# Patient Record
Sex: MALE | Race: WHITE | NOT HISPANIC OR LATINO | URBAN - METROPOLITAN AREA
[De-identification: names, ages, dates, MRNs, and addresses within clinical notes are randomized per-mention and may not be internally consistent; named-entity substitution may affect disease eponyms.]

---

## 2023-06-15 LAB
GRAM STAIN: NORMAL
TISSUE/WOUND CULTURE/SMEAR: NORMAL

## 2023-10-02 PROBLEM — J32.9 CHRONIC RHINOSINUSITIS: Status: ACTIVE | Noted: 2023-10-02

## 2023-10-02 PROBLEM — D14.0 INVERTED PAPILLOMA OF NASAL CAVITY: Status: ACTIVE | Noted: 2023-10-02

## 2023-10-02 PROBLEM — J33.9 NASAL POLYPS: Status: ACTIVE | Noted: 2023-10-02

## 2023-10-02 PROBLEM — J34.3 HYPERTROPHY OF BOTH INFERIOR NASAL TURBINATES: Status: ACTIVE | Noted: 2023-10-02

## 2023-10-02 RX ORDER — METFORMIN HYDROCHLORIDE 1000 MG/1
1000 TABLET ORAL
COMMUNITY

## 2023-10-02 RX ORDER — RANOLAZINE 500 MG/1
500 TABLET, EXTENDED RELEASE ORAL 2 TIMES DAILY
COMMUNITY

## 2023-10-02 RX ORDER — SOD CHLOR,BICARB/SQUEEZ BOTTLE
PACKET, WITH RINSE DEVICE NASAL
COMMUNITY

## 2023-10-02 RX ORDER — POTASSIUM CHLORIDE 750 MG/1
10 TABLET, FILM COATED, EXTENDED RELEASE ORAL DAILY
COMMUNITY

## 2023-10-02 RX ORDER — FUROSEMIDE 40 MG/1
40 TABLET ORAL DAILY
COMMUNITY

## 2023-10-02 RX ORDER — SACUBITRIL AND VALSARTAN 97; 103 MG/1; MG/1
1 TABLET, FILM COATED ORAL 2 TIMES DAILY
COMMUNITY

## 2023-10-02 RX ORDER — CARVEDILOL 12.5 MG/1
12.5 TABLET ORAL
COMMUNITY

## 2023-10-02 RX ORDER — ASPIRIN 81 MG/1
81 TABLET ORAL DAILY
COMMUNITY

## 2023-10-02 RX ORDER — ATORVASTATIN CALCIUM 40 MG/1
40 TABLET, FILM COATED ORAL DAILY
COMMUNITY

## 2023-10-02 RX ORDER — AMLODIPINE BESYLATE 5 MG/1
TABLET ORAL DAILY
COMMUNITY

## 2023-10-02 RX ORDER — GLIPIZIDE 5 MG/1
5 TABLET, FILM COATED, EXTENDED RELEASE ORAL 2 TIMES DAILY
COMMUNITY

## 2023-10-02 RX ORDER — CLOPIDOGREL BISULFATE 75 MG/1
TABLET ORAL DAILY
COMMUNITY

## 2023-10-04 ENCOUNTER — OFFICE VISIT (OUTPATIENT)
Dept: OTOLARYNGOLOGY | Facility: CLINIC | Age: 53
End: 2023-10-04
Payer: COMMERCIAL

## 2023-10-04 VITALS
HEART RATE: 91 BPM | DIASTOLIC BLOOD PRESSURE: 66 MMHG | WEIGHT: 220 LBS | SYSTOLIC BLOOD PRESSURE: 100 MMHG | BODY MASS INDEX: 30.8 KG/M2 | TEMPERATURE: 97.2 F | RESPIRATION RATE: 16 BRPM | HEIGHT: 71 IN

## 2023-10-04 DIAGNOSIS — J32.9 CHRONIC RHINOSINUSITIS: Primary | ICD-10-CM

## 2023-10-04 DIAGNOSIS — J33.9 NASAL POLYPS: ICD-10-CM

## 2023-10-04 DIAGNOSIS — J34.3 HYPERTROPHY OF BOTH INFERIOR NASAL TURBINATES: ICD-10-CM

## 2023-10-04 DIAGNOSIS — R09.81 NASAL CONGESTION: ICD-10-CM

## 2023-10-04 DIAGNOSIS — D14.0 INVERTED PAPILLOMA OF NASAL CAVITY: ICD-10-CM

## 2023-10-04 DIAGNOSIS — J34.89 NASAL OBSTRUCTION: ICD-10-CM

## 2023-10-04 DIAGNOSIS — R44.8 FACIAL PRESSURE: ICD-10-CM

## 2023-10-04 DIAGNOSIS — J34.89 NASAL DRAINAGE: ICD-10-CM

## 2023-10-04 PROCEDURE — 99214 OFFICE O/P EST MOD 30 MIN: CPT | Performed by: STUDENT IN AN ORGANIZED HEALTH CARE EDUCATION/TRAINING PROGRAM

## 2023-10-04 PROCEDURE — 31231 NASAL ENDOSCOPY DX: CPT | Performed by: STUDENT IN AN ORGANIZED HEALTH CARE EDUCATION/TRAINING PROGRAM

## 2023-10-04 PROCEDURE — 4004F PT TOBACCO SCREEN RCVD TLK: CPT | Performed by: STUDENT IN AN ORGANIZED HEALTH CARE EDUCATION/TRAINING PROGRAM

## 2023-10-04 RX ORDER — PRAVASTATIN SODIUM 40 MG/1
40 TABLET ORAL
COMMUNITY
Start: 2017-09-19

## 2023-10-04 RX ORDER — PREDNISONE 10 MG/1
10 TABLET ORAL DAILY
COMMUNITY
Start: 2023-06-12 | End: 2023-10-04 | Stop reason: ALTCHOICE

## 2023-10-04 RX ORDER — SPIRONOLACTONE 25 MG/1
25 TABLET ORAL
COMMUNITY
Start: 2017-11-28

## 2023-10-04 RX ORDER — PREDNISONE 10 MG/1
TABLET ORAL
Qty: 34 TABLET | Refills: 0 | Status: SHIPPED | OUTPATIENT
Start: 2023-10-04 | End: 2023-11-10 | Stop reason: ALTCHOICE

## 2023-10-04 RX ORDER — LISINOPRIL 5 MG/1
5 TABLET ORAL
COMMUNITY
Start: 2017-09-19

## 2023-10-04 ASSESSMENT — ENCOUNTER SYMPTOMS
LOSS OF SENSATION IN FEET: 0
DEPRESSION: 0
OCCASIONAL FEELINGS OF UNSTEADINESS: 0

## 2023-10-04 ASSESSMENT — PAIN SCALES - GENERAL: PAINLEVEL: 0-NO PAIN

## 2023-10-04 NOTE — PATIENT INSTRUCTIONS
What can I expect before and after surgery?     1) Before surgery   In preparation for your surgery, your physician may prescribe a preoperative regimen of medications in order to optimize the condition of your sinuses prior to surgery. The medications may include oral steroids (prednisone) that you start taking 7 days before surgery (don't take it on the day of surgery on) restart on postoperative day 1. Take the antibiotics AFTER the surgery. If any preoperative medications are deemed necessary by your physician, please be sure to start the medications on the day directed and adhere closely to the prescription. In addition, you should avoid taking aspirin and aspirin related products for at least 14 days prior to surgery. Aspirin, ibuprofen (Motrin/Advil), naproxen (Aleve), Excedrin (contains aspirin) and related products can thin the blood and create excessive bleeding both during the surgery and in the postoperative period. Tylenol is acceptable and may be taken any time up to the day of surgery.     2) After surgery   At your first postoperative visit any packs that may have been placed will be removed and your nose will be cleaned of clotted blood and debris. This is an uncomfortable procedure, so we recommend that you take a pain pill approximately one hour prior to your appointment, even if you have not been experiencing much postoperative pain up to that day. You will continue to have additional postoperative visits at an interval determined by your surgeon, until your sinuses have completely healed and are doing well. Sinus surgery does NOT eliminate the underlying causes that gave you sinusitis in the first place, so it is important that you continue your medical therapies (nasal spray, irrigation etc) even after surgery.     How do I prepare for surgery?   If your age and/or medical history indicate it, your physician may order testing prior to surgery such as blood tests, EKG, or chest x-ray. If these  have been ordered, please make sure they are completed before the day of surgery.   If you have a significant increase in your sinus infection in the week(s) prior to surgery, notify us. Your surgery may need to be postponed.   Make sure you have a  to take you home after surgery. You will NOT be allowed to leave the hospital by yourself or to drive yourself home.     DO NOT take aspirin or salicylate containing pain medication for at least ten days prior to surgery. Aspirin, even in small quantities, can significantly increase bleeding during surgery and postoperatively.     DO NOT take non-steroidal anti-inflammatory drugs (Ibuprofen, Advil, Motrin, Aleve, Naproxen) for at least five days prior to surgery. These drugs will also increase bleeding, although the effects on the blood are shorter. You may use Tylenol for pain control during this time if needed. Any other medications which thin the blood (such as Coumadin or Plavix) will need to be stopped prior to surgery. Your surgeon will discuss the timing of when to stop coumadin and whether another short acting medication such as Lovenox will be needed to use in place of the Coumadin while you are off it.     DO NOT smoke if at all possible for at least three weeks prior to surgery. Not only does smoking worsen sinus symptoms, smoking in the weeks before or after surgery will result in excessive scarring, and may result in failure of the operation.     DO NOT eat or drink anything beginning at midnight the night before surgery unless otherwise instructed by our colleagues in anesthesia. In most cases your normal morning medications should be taken with a small sip of water on the morning of surgery - your surgeon will let you know if there any exceptions to this.     NOTIFY YOUR SURGEON if you have a prosthesis or heart valve disorder that requires antibiotics at the time of surgery or if you have had prior difficulty with general anesthesia.     What will  happen during surgery?   The surgery is typically not uncomfortable and should not be an unpleasant experience. Depending on the several factors, the operation may be performed under general anesthesia or under local anesthesia with an anesthesiologist providing monitored sedation.     Regardless of anesthetic method chosen, in the preoperative area an intravenous line will be started to administer fluid and you will be given medicines in your IV to help you relax.   Once in the operating room, if local anesthesia is chosen, you will be given additional IV medication to keep you sleepy and relaxed. Medicated pieces of cotton will be placed in your nose and several injections of local anesthetic will be administered inside your nose. The anesthesiologist will make you extra sleepy during this process. Should you experience significant discomfort during the procedure, we will provide additional monitored sedation.     If you are receiving general anesthesia, a temporary tube is inserted into you mouth/throat after you are completely asleep to maintain your breathing. If the planned surgery is for more than 3 hours duration, a urinary catheter may also be placed while you are asleep. It is usually removed before you leave the recovery room. It is rare that a catheter is necessary. When the surgery is completed, the breathing tube is removed from your airway before you regain consciousness. Patients occasionally report sore throat and/or nausea postoperatively as a result of the breathing tube and anesthetic.     What can I expect following my surgery?   Some bloody postnasal discharge may occur for approximately two weeks after this procedure. This is normal and slowly improves. You SHOULD NOT blow your nose for at least seven days following surgery. Since nasal packing is usually not used, you will have some bleeding from your nose and you may go through more than one box of tissues by spotting them with blood during  the first 24-48 hours after surgery. As the sinuses begin to clear themselves, you can expect to have some thick brown drainage from your nose. This is mucus and old blood and does not indicate an infection. You may experience some discomfort postoperatively due to manipulation and inflammation. Take your pain medication as directed. Often extra-strength Tylenol® is sufficient.     On the the first day after surgery you should begin irrigating six times a day with saline using the [facility] Sinus Rinse bottle. If you have had packing placed in your nose, you will not be able to do these things until the packing is removed. Your surgeon will inform you if packing has been placed.   The first follow-up visit is usually arranged at approximately one week after surgery to clean clot and crusts from the nose. This visit is usually uncomfortable and we recommend that you take a dose of your pain medication about one hour prior to the visit. If you are using narcotic pain medicine, you will need a . Consent to the surgery also includes consent to this postoperative care.     Careful postoperative care by both you and your surgeon is essential to the success of the surgery. It is very important that you follow these instructions, as well as any additional instructions given by us, to promote healing and decrease the chance of complications from the surgery.     SOME IMPORTANT POSTOPERATIVE DO´S AND DON´TS AFTER SURGERY     ° DO NOT blow your nose until you have been given permission to do so (usually one week following surgery).     ° DO NOT bend, lift or strain for at lease one week after surgery. These activities will promote bleeding from your nose. You should not plan on participating in any rigorous activity until healing is completed.     ° DO NOT suppress the need to cough or sneeze, but cough/sneeze with your mouth open.     ° DO NOT resume use of any aspirin-containing products or other blood thinners until  after discussing this with us. Typically, you can restart after 7-10 days.     ° DO NOT overuse Tylenol (acetaminophen). Extra-strength Tylenol can be used for pain but can be harmful to your liver if used overdosed. Your prescription narcotic pain medicine also contains Tylenol, so it is important that you don´t use both at the same time. Do not exceed 2000 mg of Tylenol in 24 hours.     ° DO use nasal saline spray (without decongestant) every hour while you are awake beginning the day after surgery. This helps moisten your nose and prevents large crusts from forming.    ° DO use nasal saline irrigation at least six times daily beginning on day after surgery     ° DO take your antibiotics (if they have been prescribed for you). Mild diarrhea from antibiotic usage is common. This can often be prevented by taking acidophilus daily, which is found in yogurt with active cultures or as tablets in a health food store. If you should experience severe persistent diarrhea, this may be indicative of another health problem. In this case stop the antibiotic and notify us. Further evaluation may be required.     ° DO notify us for any of the following: temperature elevations above 100.5 F, clear watery drainage from your nose, changes in vision, swelling of the eyes, worsening headache or neck stiffness.     ° DO use a Qtip to place antibiotic ointment in the very front inside your nostrils as needed to minimize or soften crusting. Neosporin, bacitracin or double/triple antibiotic ointments are all OK to use, up to twice a day     ° DO resume your normal preoperative medications (except aspirin or other blood thinners as noted above)    For the first week following surgery you should not blow your nose or perform any activities requiring exertion such as bending, straining, exercising or lifting anything heavier than 10 pounds. You should not plan any air travel in the first week after surgery.   Ultimately, it will take about  2-3 months for you to fully recover from surgery

## 2023-10-04 NOTE — PROGRESS NOTES
HPI  10/4/23  The patient presents for follow-up, he is here to discuss his upcoming surgery and clarify questions.     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 No evidence of dysplasia or malignancy.    7/24/23  The patient presents for follow-up, reports improvement of his nasal pressure and nasal congestion following antibiotic course. CT reviewed with the patient notable for bilateral bl pansinusitis with possible dehiscence of the right vidian canal. Partial pneumatization of the right optic strut.  Recall   53-year-old male presenting for initial evaluation of multiple sinonasal complaints.    Main Symptoms: Daily sinonasal symptoms include bilateral nasal congestion/obstruction, anterior and posterior nasal drainage, facial pressure along the maxillary and frontal regions bilaterally but more noticeable on the left, anosmia.   Duration: Years, worse for the past year   Laterality: Bilateral    Patient denies facial numbness, dental pain, watery/ itchy eyes.   No odynophagia/dysphagia/SOB/dyspnea/hoarseness/fevers/chills/weight loss/night sweats.    Current treatment:  Nasal Steroid: Has tried Nasonex without significant improvement of his symptoms  Sinus Rinse: No  Antihistamine: No  Prednisone: No  Other: None    Recent CT: None   Previous nasal/sinus surgery: None      No past medical history on file.    No past surgical history on file.      Current Outpatient Medications on File Prior to Visit   Medication Sig Dispense Refill    amLODIPine (Norvasc) 5 mg tablet Take by mouth once daily.      aspirin 81 mg EC tablet Take 1 tablet (81 mg) by mouth once daily.      atorvastatin (Lipitor) 40 mg tablet Take 1 tablet (40 mg) by mouth once daily.      clopidogrel (Plavix) 75 mg tablet Take by mouth once daily.      furosemide (Lasix) 40 mg tablet Take 1 tablet (40 mg) by mouth once daily.      glipiZIDE XL (Glucotrol XL) 5 mg 24 hr tablet Take 1  tablet (5 mg) by mouth 2 times a day. Do not crush, chew, or split.      lisinopril 5 mg tablet Take 1 tablet (5 mg) by mouth once daily.      metFORMIN (Glucophage) 1,000 mg tablet Take 1 tablet (1,000 mg) by mouth 2 times a day with meals.      potassium chloride CR 10 mEq ER tablet Take 1 tablet (10 mEq) by mouth once daily. Do not crush, chew, or split.      pravastatin (Pravachol) 40 mg tablet Take 1 tablet (40 mg) by mouth once daily.      ranolazine (Ranexa) 500 mg 12 hr tablet Take 1 tablet (500 mg) by mouth 2 times a day. Do not crush, chew, or split.      sacubitriL-valsartan (Entresto)  mg tablet Take 1 tablet by mouth 2 times a day.      sod chloride-sodium bicarb (Sinus Rinse Starter) nasal rinse Administer into affected nostril(s).      spironolactone (Aldactone) 25 mg tablet Take 1 tablet (25 mg) by mouth once daily.      carvedilol (Coreg) 12.5 mg tablet Take 1 tablet (12.5 mg) by mouth 2 times a day with meals.      [DISCONTINUED] predniSONE (Deltasone) 10 mg tablet Take 1 tablet (10 mg) by mouth once daily.       No current facility-administered medications on file prior to visit.        Review of Systems  A detailed 12 point ROS was performed and is negative except as noted in the intake form, HPI and/or Past Medical History     Vitals:    10/04/23 0902   BP: 100/66   Pulse: 91   Resp: 16   Temp: 36.2 °C (97.2 °F)        Physical Exam   CONSTITUTIONAL: Vitals -reviewed from intake field  VOICE: Normal voice quality  RESPIRATION: Breathing comfortably, no stridor.  CV: No clubbing/cyanosis/edema in hands.  EYES: EOM Intact, sclera normal.  NEURO: Alert and oriented times 3, Cranial nerves V,VII intact and symmetric bilaterally.  HEAD AND FACE: Symmetric facial features, no masses or lesions, sinuses nontender to palpation.  SALIVARY GLANDS: Parotid and submandibular glands normal bilaterally.  EARS: Normal external ears, external auditory canals, and TMs to otoscopy  + NOSE: External nose  midline, anterior rhinoscopy is normal with limited visualization to the anterior aspect of the interior turbinates. No lesions noted.  Bilateral inferior turbinate hypertrophy, nasal corridors obstructed by nasal polyps bilaterally  ORAL CAVITY/OROPHARYNX/LIPS: Normal mucous membranes, normal floor of mouth/tongue/OP, no masses or lesions are noted.  PHARYNGEAL WALLS AND NASOPHARYNX: No masses noted. Mucosa appears clean and moist  NECK/LYMPH: No LAD, no thyroid masses. Trachea palpably midline  SKIN: Neck skin is without scar or injury  PSYCH: Alert and oriented with appropriate mood and affect      Nasal endoscopy:  PROCEDURE NOTE    For better visualization because of septal deviation, turbinate hypertrophy nasal endoscopy was performed after verbal consent was obtained by the patient and/or guardian. Both nostrils were sprayed with a mixture of lidocaine 4% and Afrin. After a sufficient amount of time elapsed for mucosal anesthesia to take place, the nasal endoscope was advanced into the nostril.    The following areas were visualized:  Nasal passage, nasal septum, turbinates, middle meatus, nasopharynx, sinus ostia    The patient tolerated the procedure well and these structures were found to be normal except as follows:  Bilateral inferior turbinate hypertrophy  Bilateral generalized mucosal edema  Right: Nasal polyps obstructing most of the nasal corridors,   Left: Nasal corridor completely obstructed by nasal polyps.      No mucopurulence noted bilaterally.    Assessment   Chronic rhinosinusitis with nasal polyps  Facial pressure  Nasal airway obstruction  Nasal drainage  Bilateral inferior turbinate hypertrophy  On anticoagulation, clopidogrel for cardiac stents   Hx of MI, pacemaker/ defibrillator placement     Plan  The patient and I discussed their current nasal / sinus symptoms as well as several therapeutic options. Nasal endoscopy notable for bilateral mucosal edema and nasal polyps obstructing the  nasal corridors bilaterally.   Polypoid tissue from the left nasal corridor biopsied.   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  The condition was reviewed and explained, multiple treatment alternatives, risk and benefits discussed. He would like to proceed with surgery including bilateral functional endoscopic sinus surgery, resection of left sinonasal papilloma, bilateral inferior turbinate reduction/outfracture, possible septoplasty.    The patient and I discussed the aspects of the proposed surgery today. We discussed alternative therapies, which include doing nothing and continuing medical therapy. We discussed the fact that surgery offers an opportunity to correct any anatomic deformities and to relieve a problem that has not been improved with medical therapy. The anticipated benefits from endoscopic surgery include but are not limited to: eradication of sinus disease, relief from pain and symptoms. The risks and complications associated with this surgery were discussed. These include, but were not limited to: anesthesia/medication complications (cva, mi, death), heart/lung/brain dysfunction, synechiae formation, poor cosmetic result possibly requiring reconstructive surgery, septal perforation possibly requiring reconstructive surgery, bleeding, infection, postoperative pain, failure to relieve pain and other preoperative symptoms, visual acuity changes (temporary or permanent), loss of smell/taste, csf leak, need for further surgeries in future. Each of these potential complications was discussed in detail. No guarantees were implied and the possibility of recurrence was discussed and understood. The nature of the procedure and the pre and postoperative processes were discussed as well.   The patient understands, asked appropriate questions, and wishes to proceed with surgery.       Underwent cardiology clearance and pre/post operative anticoagulation  planning.  Continue saline/mometasone rinses  Preoperative prednisone taper prescribed (risks /potential adverse effects discussed)  Smoking cessation discussed

## 2023-10-17 NOTE — PROGRESS NOTES
Discussed results with the patient over the phone.  Stopped using systemic steroids yesterday and glucose measurement today was in the low 200s.  He will continue to monitor his glucose and optimize control.

## 2023-10-19 PROCEDURE — 88305 TISSUE EXAM BY PATHOLOGIST: CPT

## 2023-10-19 PROCEDURE — 88305 TISSUE EXAM BY PATHOLOGIST: CPT | Performed by: PATHOLOGY

## 2023-10-19 PROCEDURE — 88311 DECALCIFY TISSUE: CPT | Performed by: PATHOLOGY

## 2023-10-19 PROCEDURE — 88311 DECALCIFY TISSUE: CPT

## 2023-10-20 ENCOUNTER — LAB REQUISITION (OUTPATIENT)
Dept: LAB | Facility: HOSPITAL | Age: 53
End: 2023-10-20
Payer: COMMERCIAL

## 2023-10-20 DIAGNOSIS — D14.0 BENIGN NEOPLASM OF MIDDLE EAR, NASAL CAVITY AND ACCESSORY SINUSES: ICD-10-CM

## 2023-10-21 PROBLEM — E78.5 HYPERLIPIDEMIA: Chronic | Status: RESOLVED | Noted: 2017-09-15 | Resolved: 2023-10-21

## 2023-10-21 PROBLEM — G89.29 CHRONIC TOE PAIN, LEFT FOOT: Status: RESOLVED | Noted: 2017-09-15 | Resolved: 2023-10-21

## 2023-10-21 PROBLEM — I50.9 ACUTE ON CHRONIC CONGESTIVE HEART FAILURE (MULTI): Status: RESOLVED | Noted: 2020-02-04 | Resolved: 2023-10-21

## 2023-10-21 PROBLEM — I25.10 CAD (CORONARY ARTERY DISEASE): Chronic | Status: RESOLVED | Noted: 2017-09-15 | Resolved: 2023-10-21

## 2023-10-21 PROBLEM — I50.23: Status: RESOLVED | Noted: 2020-02-07 | Resolved: 2023-10-21

## 2023-10-21 PROBLEM — J18.9 PNEUMONIA: Status: RESOLVED | Noted: 2020-02-03 | Resolved: 2023-10-21

## 2023-10-21 PROBLEM — M79.675 CHRONIC TOE PAIN, LEFT FOOT: Status: RESOLVED | Noted: 2017-09-15 | Resolved: 2023-10-21

## 2023-10-21 PROBLEM — R07.9 CHEST PAIN: Status: RESOLVED | Noted: 2022-10-02 | Resolved: 2023-10-21

## 2023-10-21 PROBLEM — J96.01 ACUTE RESPIRATORY FAILURE WITH HYPOXIA (MULTI): Status: RESOLVED | Noted: 2020-02-03 | Resolved: 2023-10-21

## 2023-10-21 PROBLEM — R94.39 ABNORMAL STRESS TEST: Status: RESOLVED | Noted: 2017-11-28 | Resolved: 2023-10-21

## 2023-10-21 PROBLEM — I10 HTN (HYPERTENSION): Chronic | Status: RESOLVED | Noted: 2017-09-15 | Resolved: 2023-10-21

## 2023-10-21 PROBLEM — E11.9 TYPE 2 DIABETES MELLITUS WITHOUT COMPLICATION, WITHOUT LONG-TERM CURRENT USE OF INSULIN (MULTI): Status: RESOLVED | Noted: 2020-02-07 | Resolved: 2023-10-21

## 2023-10-21 PROBLEM — I21.4 NSTEMI (NON-ST ELEVATED MYOCARDIAL INFARCTION) (MULTI): Status: RESOLVED | Noted: 2022-10-01 | Resolved: 2023-10-21

## 2023-10-21 RX ORDER — GLYBURIDE 5 MG/1
5 TABLET ORAL 2 TIMES DAILY
COMMUNITY

## 2023-10-21 RX ORDER — ISOSORBIDE MONONITRATE 30 MG/1
30 TABLET, EXTENDED RELEASE ORAL
COMMUNITY
Start: 2017-11-28

## 2023-10-21 RX ORDER — INSULIN GLARGINE 100 [IU]/ML
15 INJECTION, SOLUTION SUBCUTANEOUS NIGHTLY
COMMUNITY
Start: 2017-09-19

## 2023-10-27 ENCOUNTER — OFFICE VISIT (OUTPATIENT)
Dept: OTOLARYNGOLOGY | Facility: CLINIC | Age: 53
End: 2023-10-27
Payer: COMMERCIAL

## 2023-10-27 VITALS
HEART RATE: 88 BPM | TEMPERATURE: 97.9 F | DIASTOLIC BLOOD PRESSURE: 72 MMHG | RESPIRATION RATE: 16 BRPM | WEIGHT: 220 LBS | SYSTOLIC BLOOD PRESSURE: 109 MMHG | BODY MASS INDEX: 30.8 KG/M2 | HEIGHT: 71 IN

## 2023-10-27 DIAGNOSIS — J34.89 NASAL DRAINAGE: ICD-10-CM

## 2023-10-27 DIAGNOSIS — J32.9 CHRONIC RHINOSINUSITIS: Primary | ICD-10-CM

## 2023-10-27 DIAGNOSIS — J33.9 NASAL POLYPS: ICD-10-CM

## 2023-10-27 DIAGNOSIS — J34.3 HYPERTROPHY OF BOTH INFERIOR NASAL TURBINATES: ICD-10-CM

## 2023-10-27 DIAGNOSIS — J34.89 NASAL CRUSTING: ICD-10-CM

## 2023-10-27 DIAGNOSIS — R44.8 FACIAL PRESSURE: ICD-10-CM

## 2023-10-27 DIAGNOSIS — J34.89 NASAL OBSTRUCTION: ICD-10-CM

## 2023-10-27 DIAGNOSIS — R09.81 NASAL CONGESTION: ICD-10-CM

## 2023-10-27 DIAGNOSIS — D14.0 INVERTED PAPILLOMA OF NASAL CAVITY: ICD-10-CM

## 2023-10-27 PROCEDURE — 99024 POSTOP FOLLOW-UP VISIT: CPT | Performed by: STUDENT IN AN ORGANIZED HEALTH CARE EDUCATION/TRAINING PROGRAM

## 2023-10-27 PROCEDURE — 31237 NSL/SINS NDSC SURG BX POLYPC: CPT | Performed by: STUDENT IN AN ORGANIZED HEALTH CARE EDUCATION/TRAINING PROGRAM

## 2023-10-27 PROCEDURE — 4004F PT TOBACCO SCREEN RCVD TLK: CPT | Performed by: STUDENT IN AN ORGANIZED HEALTH CARE EDUCATION/TRAINING PROGRAM

## 2023-10-27 NOTE — PROGRESS NOTES
The patient is here for follow up.   Most recent surgery: 10/19/23  Resection of left sinonasal papilloma  Bilateral nasal endoscopy with total ethmoidectomy including frontal sinusotomy with tissue removal  Bilateral maxillary endoscopy with  tissue removal  Bilateral submucosal inferior turbinate reductions  Extracranial CT image guidance    Findings:   Papillomatous mass embedded within generalized mucosal inflammation and polypoid tissue attached to the left lateral nasal wall.  Left orbital wall dehiscence, intact periorbita  Polypoid changes throughout dissected sinuses  Propel contour stents placed within each frontal drainage pathway  Middle turbinate resected bilaterally  Mucopurulent drainage noted within the maxillary sinuses bilaterally (culture obtained from the left side)  Nasal pore placed in bilateral middle meatuses at the conclusion of the procedure    Current symptoms: The patient reports he has been doing well, endorses significant improvement of his nasal breathing.  Only using small amount of saline nasal spray, did not start saline irrigations.    Current treatment:  Antibiotics: Doxycycline  Sinus Rinse: No  Steroids: No  Other: None      ROS - No fevers, chills. No cough, hemoptysis. No n/v      Past Medical History:   Diagnosis Date    Abnormal stress test 11/28/2017    Acute on chronic congestive heart failure (CMS/[facility]ly [facility]) 02/04/2020    Acute respiratory failure with hypoxia (CMS/[facility]) 02/03/2020    CAD (coronary artery disease) 09/15/2017    Chest pain 10/02/2022    Chronic toe pain, left foot 09/15/2017    HTN (hypertension) 09/15/2017    Hyperlipidemia 09/15/2017    NSTEMI (non-ST elevated myocardial infarction) (CMS/[facility]) 10/01/2022    Pneumonia 02/03/2020    Systolic CHF, acute on chronic (CMS/[facility]) 02/07/2020    Type 2 diabetes mellitus without complication, without long-term current use of insulin (CMS/[facility]) 02/07/2020       History reviewed. No pertinent surgical history.      Current  Outpatient Medications on File Prior to Visit   Medication Sig Dispense Refill    amLODIPine (Norvasc) 5 mg tablet Take by mouth once daily.      aspirin 81 mg EC tablet Take 1 tablet (81 mg) by mouth once daily.      atorvastatin (Lipitor) 40 mg tablet Take 1 tablet (40 mg) by mouth once daily.      carvedilol (Coreg) 12.5 mg tablet Take 1 tablet (12.5 mg) by mouth 2 times a day with meals.      clopidogrel (Plavix) 75 mg tablet Take by mouth once daily.      furosemide (Lasix) 40 mg tablet Take 1 tablet (40 mg) by mouth once daily.      glipiZIDE XL (Glucotrol XL) 5 mg 24 hr tablet Take 1 tablet (5 mg) by mouth 2 times a day. Do not crush, chew, or split.      glyBURIDE (Diabeta) 5 mg tablet Take 1 tablet (5 mg) by mouth 2 times a day.      insulin glargine (Lantus Solostar U-100 Insulin) 100 unit/mL (3 mL) pen Inject 15 Units under the skin once daily at bedtime.      isosorbide mononitrate ER (Imdur) 30 mg 24 hr tablet Take 1 tablet (30 mg) by mouth once daily.      lisinopril 5 mg tablet Take 1 tablet (5 mg) by mouth once daily.      metFORMIN (Glucophage) 1,000 mg tablet Take 1 tablet (1,000 mg) by mouth 2 times a day with meals.      potassium chloride CR 10 mEq ER tablet Take 1 tablet (10 mEq) by mouth once daily. Do not crush, chew, or split.      pravastatin (Pravachol) 40 mg tablet Take 1 tablet (40 mg) by mouth once daily.      ranolazine (Ranexa) 500 mg 12 hr tablet Take 1 tablet (500 mg) by mouth 2 times a day. Do not crush, chew, or split.      sacubitriL-valsartan (Entresto)  mg tablet Take 1 tablet by mouth 2 times a day.      sod chloride-sodium bicarb (Sinus Rinse Starter) nasal rinse Administer into affected nostril(s).      spironolactone (Aldactone) 25 mg tablet Take 1 tablet (25 mg) by mouth once daily.      predniSONE (Deltasone) 10 mg tablet Take 4 tablets daily for 4 days, then 3 tablets daily for 3 days, then 2 tablets daily for 3 days, then 1 tablet daily for 3 days, then stop.  (Patient not taking: Reported on 10/27/2023) 34 tablet 0     No current facility-administered medications on file prior to visit.        Review of Systems  A detailed 12 point ROS was performed and is negative except as noted in the intake form, HPI and/or Past Medical History     Vitals:    10/27/23 0900   BP: 109/72   Pulse: 88   Resp: 16   Temp: 36.6 °C (97.9 °F)        Physical Exam   CONSTITUTIONAL: Vitals -reviewed from intake field  VOICE: Normal voice quality  RESPIRATION: Breathing comfortably, no stridor.  CV: No clubbing/cyanosis/edema in hands.  EYES: EOM Intact, sclera normal.  NEURO: Alert and oriented times 3, Cranial nerves V,VII intact and symmetric bilaterally.  HEAD AND FACE: Symmetric facial features, no masses or lesions, sinuses nontender to palpation.  SALIVARY GLANDS: Parotid and submandibular glands normal bilaterally.  EARS: Normal external ears, external auditory canals, and TMs to otoscopy  + NOSE: External nose midline, anterior rhinoscopy is normal with limited visualization to the anterior aspect of the interior turbinates. No lesions noted.  ORAL CAVITY/OROPHARYNX/LIPS: Normal mucous membranes, normal floor of mouth/tongue/OP, no masses or lesions are noted.  PHARYNGEAL WALLS AND NASOPHARYNX: No masses noted. Mucosa appears clean and moist  NECK/LYMPH: No LAD, no thyroid masses. Trachea palpably midline  SKIN: Neck skin is without scar or injury  PSYCH: Alert and oriented with appropriate mood and affect     Procedure: bilateral nasal endoscopy with debridement  Pre-op dx: Chronic rhinosinusitis  Post-op dx: same    Procedure in detail:   The risks, benefits, and alternatives were explained.  The patient wished to proceed and provided verbal consent.    After topical anesthetic was given and nasal endoscopy with debridement was performed bilaterally:    Sinus endoscopy:     Right:    Maxillary antrostomy patent  Ethmoid clear to roof  Frontal recess patent     Left:   Maxillary  antrostomy patent  Ethmoid clear to roof  Frontal recess patent     Notable findings: Extensive amount of nasal crusting judiciously removed from bilateral middle meatus and ethmoid region (conservative debridement performed due to anticoagulation status) nasal corridors widely patent.  Generalized bilateral mucosal edema.  No mucopurulence noted.    Assessment  CRSw/NP s/p ESS   Left sinonasal inverted papilloma s/p resection   Bilateral inferior turbinate hypertrophy, s/p Bl IT reduction / OF.   On anticoagulation, clopidogrel for cardiac stents   Hx of CAD/MI, pacemaker/ defibrillator placement, DM    Plan   Start saline irrigations  Start mometasone/saline rinses  RTC 1w

## 2023-10-29 RX ORDER — OXYCODONE AND ACETAMINOPHEN 5; 325 MG/1; MG/1
1 TABLET ORAL EVERY 6 HOURS PRN
COMMUNITY
Start: 2023-10-20 | End: 2023-11-10 | Stop reason: ALTCHOICE

## 2023-10-29 RX ORDER — DOXYCYCLINE 100 MG/1
100 CAPSULE ORAL 2 TIMES DAILY
COMMUNITY
Start: 2023-10-20 | End: 2023-11-10 | Stop reason: ALTCHOICE

## 2023-11-03 ENCOUNTER — APPOINTMENT (OUTPATIENT)
Dept: OTOLARYNGOLOGY | Facility: CLINIC | Age: 53
End: 2023-11-03
Payer: COMMERCIAL

## 2023-11-06 LAB
LABORATORY COMMENT REPORT: NORMAL
Lab: NORMAL
PATH REPORT.FINAL DX SPEC: NORMAL
PATH REPORT.GROSS SPEC: NORMAL
PATH REPORT.RELEVANT HX SPEC: NORMAL
PATH REPORT.TOTAL CANCER: NORMAL

## 2023-11-10 ENCOUNTER — OFFICE VISIT (OUTPATIENT)
Dept: OTOLARYNGOLOGY | Facility: CLINIC | Age: 53
End: 2023-11-10
Payer: COMMERCIAL

## 2023-11-10 VITALS
RESPIRATION RATE: 16 BRPM | BODY MASS INDEX: 30.8 KG/M2 | SYSTOLIC BLOOD PRESSURE: 103 MMHG | WEIGHT: 220 LBS | DIASTOLIC BLOOD PRESSURE: 67 MMHG | HEIGHT: 71 IN | TEMPERATURE: 97 F | HEART RATE: 88 BPM

## 2023-11-10 DIAGNOSIS — J32.9 CHRONIC RHINOSINUSITIS: Primary | ICD-10-CM

## 2023-11-10 DIAGNOSIS — D14.0 INVERTED PAPILLOMA OF NASAL CAVITY: ICD-10-CM

## 2023-11-10 DIAGNOSIS — J34.89 NASAL DRAINAGE: ICD-10-CM

## 2023-11-10 DIAGNOSIS — J34.89 NASAL OBSTRUCTION: ICD-10-CM

## 2023-11-10 DIAGNOSIS — J33.9 NASAL POLYPS: ICD-10-CM

## 2023-11-10 DIAGNOSIS — J34.89 NASAL CRUSTING: ICD-10-CM

## 2023-11-10 DIAGNOSIS — R09.81 NASAL CONGESTION: ICD-10-CM

## 2023-11-10 DIAGNOSIS — R44.8 FACIAL PRESSURE: ICD-10-CM

## 2023-11-10 DIAGNOSIS — J34.3 HYPERTROPHY OF BOTH INFERIOR NASAL TURBINATES: ICD-10-CM

## 2023-11-10 PROCEDURE — 87070 CULTURE OTHR SPECIMN AEROBIC: CPT

## 2023-11-10 PROCEDURE — 4004F PT TOBACCO SCREEN RCVD TLK: CPT | Performed by: STUDENT IN AN ORGANIZED HEALTH CARE EDUCATION/TRAINING PROGRAM

## 2023-11-10 PROCEDURE — 87186 SC STD MICRODIL/AGAR DIL: CPT

## 2023-11-10 PROCEDURE — 87185 SC STD ENZYME DETCJ PER NZM: CPT

## 2023-11-10 PROCEDURE — 31237 NSL/SINS NDSC SURG BX POLYPC: CPT | Performed by: STUDENT IN AN ORGANIZED HEALTH CARE EDUCATION/TRAINING PROGRAM

## 2023-11-10 PROCEDURE — 99024 POSTOP FOLLOW-UP VISIT: CPT | Performed by: STUDENT IN AN ORGANIZED HEALTH CARE EDUCATION/TRAINING PROGRAM

## 2023-11-10 PROCEDURE — 87075 CULTR BACTERIA EXCEPT BLOOD: CPT

## 2023-11-10 RX ORDER — DOXYCYCLINE 100 MG/1
100 CAPSULE ORAL 2 TIMES DAILY
Qty: 20 CAPSULE | Refills: 0 | Status: SHIPPED | OUTPATIENT
Start: 2023-11-10 | End: 2023-11-13

## 2023-11-10 RX ORDER — MUPIROCIN 20 MG/G
OINTMENT TOPICAL
Qty: 30 G | Refills: 0 | Status: SHIPPED | OUTPATIENT
Start: 2023-11-10

## 2023-11-10 NOTE — PROGRESS NOTES
The patient is here for follow up.   Most recent surgery: 10/19/23  Resection of left sinonasal papilloma  Bilateral nasal endoscopy with total ethmoidectomy including frontal sinusotomy with tissue removal  Bilateral maxillary endoscopy with  tissue removal  Bilateral submucosal inferior turbinate reductions  Extracranial CT image guidance    OR Pathology report reviewed and discussed with the patient consistent with respiratory mucosa with chronic inflammation, inflammatory polyps with squamous metaplasia and focal invagination of the epithelium which was insufficient to make a firm diagnosis of inverted papilloma.    Current symptoms: The patient presents for follow-up, states he has been doing well.  Reports significant improvement of his nasal breathing.        Current treatment:  Antibiotics: Doxycycline  Sinus Rinse: No  Steroids: No  Other: None      ROS - No fevers, chills. No cough, hemoptysis. No n/v      Past Medical History:   Diagnosis Date    Abnormal stress test 11/28/2017    Acute on chronic congestive heart failure (CMS/[facility]) 02/04/2020    Acute respiratory failure with hypoxia (CMS/[facility]) 02/03/2020    CAD (coronary artery disease) 09/15/2017    Chest pain 10/02/2022    Chronic toe pain, left foot 09/15/2017    HTN (hypertension) 09/15/2017    Hyperlipidemia 09/15/2017    NSTEMI (non-ST elevated myocardial infarction) (CMS/[facility]) 10/01/2022    Pneumonia 02/03/2020    Systolic CHF, acute on chronic (CMS/[facility]) 02/07/2020    Type 2 diabetes mellitus without complication, without long-term current use of insulin (CMS/[facility]) 02/07/2020       No past surgical history on file.      Current Outpatient Medications on File Prior to Visit   Medication Sig Dispense Refill    amLODIPine (Norvasc) 5 mg tablet Take by mouth once daily.      aspirin 81 mg EC tablet Take 1 tablet (81 mg) by mouth once daily.      atorvastatin (Lipitor) 40 mg tablet Take 1 tablet (40 mg) by mouth once daily.      carvedilol (Coreg) 12.5 mg  tablet Take 1 tablet (12.5 mg) by mouth 2 times a day with meals.      clopidogrel (Plavix) 75 mg tablet Take by mouth once daily.      furosemide (Lasix) 40 mg tablet Take 1 tablet (40 mg) by mouth once daily.      glipiZIDE XL (Glucotrol XL) 5 mg 24 hr tablet Take 1 tablet (5 mg) by mouth 2 times a day. Do not crush, chew, or split.      glyBURIDE (Diabeta) 5 mg tablet Take 1 tablet (5 mg) by mouth 2 times a day.      insulin glargine (Lantus Solostar U-100 Insulin) 100 unit/mL (3 mL) pen Inject 15 Units under the skin once daily at bedtime.      isosorbide mononitrate ER (Imdur) 30 mg 24 hr tablet Take 1 tablet (30 mg) by mouth once daily.      lisinopril 5 mg tablet Take 1 tablet (5 mg) by mouth once daily.      metFORMIN (Glucophage) 1,000 mg tablet Take 1 tablet (1,000 mg) by mouth 2 times a day with meals.      potassium chloride CR 10 mEq ER tablet Take 1 tablet (10 mEq) by mouth once daily. Do not crush, chew, or split.      pravastatin (Pravachol) 40 mg tablet Take 1 tablet (40 mg) by mouth once daily.      ranolazine (Ranexa) 500 mg 12 hr tablet Take 1 tablet (500 mg) by mouth 2 times a day. Do not crush, chew, or split.      sacubitriL-valsartan (Entresto)  mg tablet Take 1 tablet by mouth 2 times a day.      sod chloride-sodium bicarb (Sinus Rinse Starter) nasal rinse Administer into affected nostril(s).      spironolactone (Aldactone) 25 mg tablet Take 1 tablet (25 mg) by mouth once daily.      [DISCONTINUED] doxycycline (Vibramycin) 100 mg capsule Take 1 capsule (100 mg) by mouth 2 times a day.      [DISCONTINUED] oxyCODONE-acetaminophen (Percocet) 5-325 mg tablet Take 1 tablet by mouth every 6 hours if needed for moderate pain (4 - 6).      [DISCONTINUED] predniSONE (Deltasone) 10 mg tablet Take 4 tablets daily for 4 days, then 3 tablets daily for 3 days, then 2 tablets daily for 3 days, then 1 tablet daily for 3 days, then stop. (Patient not taking: Reported on 10/27/2023) 34 tablet 0     No  current facility-administered medications on file prior to visit.        Review of Systems  A detailed 12 point ROS was performed and is negative except as noted in the intake form, HPI and/or Past Medical History     Vitals:    11/10/23 0947   BP: 103/67   Pulse: 88   Resp: 16   Temp: 36.1 °C (97 °F)        Physical Exam   CONSTITUTIONAL: Vitals -reviewed from intake field  VOICE: Normal voice quality  RESPIRATION: Breathing comfortably, no stridor.  CV: No clubbing/cyanosis/edema in hands.  EYES: EOM Intact, sclera normal.  NEURO: Alert and oriented times 3, Cranial nerves V,VII intact and symmetric bilaterally.  HEAD AND FACE: Symmetric facial features, no masses or lesions, sinuses nontender to palpation.  SALIVARY GLANDS: Parotid and submandibular glands normal bilaterally.  EARS: Normal external ears, external auditory canals, and TMs to otoscopy  + NOSE: External nose midline, anterior rhinoscopy is normal with limited visualization to the anterior aspect of the interior turbinates. No lesions noted.  ORAL CAVITY/OROPHARYNX/LIPS: Normal mucous membranes, normal floor of mouth/tongue/OP, no masses or lesions are noted.  PHARYNGEAL WALLS AND NASOPHARYNX: No masses noted. Mucosa appears clean and moist  NECK/LYMPH: No LAD, no thyroid masses. Trachea palpably midline  SKIN: Neck skin is without scar or injury  PSYCH: Alert and oriented with appropriate mood and affect     Procedure: bilateral nasal endoscopy with debridement  Pre-op dx: Chronic rhinosinusitis  Post-op dx: same    Procedure in detail:   The risks, benefits, and alternatives were explained.  The patient wished to proceed and provided verbal consent.    After topical anesthetic was given and nasal endoscopy with debridement was performed bilaterally:    Sinus endoscopy:     Right:    Maxillary antrostomy patent  Ethmoid clear to roof  Frontal recess patent     Left:   Maxillary antrostomy patent  Ethmoid clear to roof  Frontal recess patent      Notable findings: Nasal crusting judiciously removed from bilateral middle meatus and posterior ethmoid region bilaterally.  Nasal corridors widely patent.  Generalized bilateral mucosal edema.  Mucopurulent drainage noted along the left middle meatus cultured.    Assessment  CRSw/NP s/p ESS   Left sinonasal inverted papilloma s/p resection   Bilateral inferior turbinate hypertrophy, s/p Bl IT reduction / OF.   On anticoagulation (ASA, clopidogrel for cardiac stents).   Hx of CAD/MI, defibrillator placement, DM    Plan   Nasal crusts removed bilaterally from middle meatus and posterior ethmoid region, mucopurulent drainage cultured from left maxillary sinus.    Doxycycline and saline/mupirocin rinses  prescribed.   Continue saline/mometasone rinses.   RTC 2m    Addendum 11/13/23  Cx + for Serratia growth. Ab changed to levofloxacin according to culture sensitivities.  11/17/23   Was able to reach the patient over the phone, antibiotic treatment plan, potential adverse effects/risks discussed.

## 2023-11-13 LAB
B-LACTAMASE ORGANISM ISLT: POSITIVE
BACTERIA SPEC CULT: ABNORMAL
BACTERIA SPEC CULT: ABNORMAL
GRAM STN SPEC: ABNORMAL
GRAM STN SPEC: ABNORMAL

## 2023-11-13 RX ORDER — LEVOFLOXACIN 750 MG/1
750 TABLET ORAL DAILY
Qty: 10 TABLET | Refills: 0 | Status: SHIPPED | OUTPATIENT
Start: 2023-11-13 | End: 2023-11-23

## 2024-01-10 ENCOUNTER — APPOINTMENT (OUTPATIENT)
Dept: OTOLARYNGOLOGY | Facility: CLINIC | Age: 54
End: 2024-01-10
Payer: COMMERCIAL